# Patient Record
Sex: FEMALE | Race: BLACK OR AFRICAN AMERICAN | NOT HISPANIC OR LATINO | Employment: FULL TIME
[De-identification: names, ages, dates, MRNs, and addresses within clinical notes are randomized per-mention and may not be internally consistent; named-entity substitution may affect disease eponyms.]

---

## 2024-07-28 ENCOUNTER — HEALTH MAINTENANCE LETTER (OUTPATIENT)
Age: 34
End: 2024-07-28

## 2024-09-30 ENCOUNTER — OFFICE VISIT (OUTPATIENT)
Dept: PHARMACY | Facility: PHYSICIAN GROUP | Age: 34
End: 2024-09-30
Payer: COMMERCIAL

## 2024-09-30 VITALS — WEIGHT: 254.5 LBS

## 2024-09-30 DIAGNOSIS — E66.01 MORBID OBESITY (H): Primary | ICD-10-CM

## 2024-09-30 PROCEDURE — 99207 PR NO CHARGE LOS: CPT | Performed by: PHARMACIST

## 2024-09-30 NOTE — PROGRESS NOTES
Clinical Pharmacy Consult:                                                    Sangita English is a 34 year old female coming in for a clinical pharmacist consult.  She was referred to me from Dr. Mcgarry.     Reason for Consult: Injection education.     Discussion: Patient is in today to learn how to give once weekly Zepbound injections. Patient reports she had Zepbound 2.5 mg prescribed by her previous provider at [facility]. Patient is now working with Dr. Mcgarry at [facility]. Her insurance does not cover weight loss injectables, however, patient is using the  savings card to get Zepbound at a lower cost.   Today we went over how the medication works, side effects and administration. Patient gave herself the first shot today in clinic. She denies any additional questions at this time.       Plan:  1. Begin taking Zepbound 2.5 mg weekly shot.  2. Follow up with Dr. Mcgarry before you give your fourth shot of the 2.5 mg dose to discuss titrating this medication      Essie Hearn, PharmD  Medication Therapy Management Pharmacist

## 2024-09-30 NOTE — PATIENT INSTRUCTIONS
"Recommendations from today's MTM visit:                                                    MTM (medication therapy management) is a service provided by a clinical pharmacist designed to help you get the most of out of your medicines.   Today we reviewed what your medicines are for, how to know if they are working, that your medicines are safe and how to make your medicine regimen as easy as possible.      1. Begin taking Zepbound 2.5 mg weekly shot.  2. Follow up with Dr. Mcgarry before you give your fourth shot of the 2.5 mg dose to discuss titrating this medication    Follow-up with Dr. Mcgarry before your 4th Zepbound shot.     It was great speaking with you today.  I value your experience and would be very thankful for your time in providing feedback in our clinic survey. In the next few days, you may receive an email or text message from [employer] with a link to a survey related to your  clinical pharmacist.\"     To schedule another MTM appointment, please call the clinic directly or you may call the MTM scheduling line at [phone].    My Clinical Pharmacist's contact information:                                                      Please feel free to contact me with any questions or concerns you have.      Essie Hearn, PharmD  Medication Therapy Management Pharmacist    "

## 2025-07-20 ENCOUNTER — HEALTH MAINTENANCE LETTER (OUTPATIENT)
Age: 35
End: 2025-07-20